# Patient Record
Sex: MALE | Race: WHITE | NOT HISPANIC OR LATINO
[De-identification: names, ages, dates, MRNs, and addresses within clinical notes are randomized per-mention and may not be internally consistent; named-entity substitution may affect disease eponyms.]

---

## 2019-06-26 ENCOUNTER — TRANSCRIPTION ENCOUNTER (OUTPATIENT)
Age: 74
End: 2019-06-26

## 2019-06-28 PROBLEM — Z00.00 ENCOUNTER FOR PREVENTIVE HEALTH EXAMINATION: Status: ACTIVE | Noted: 2019-06-28

## 2019-07-03 ENCOUNTER — RECORD ABSTRACTING (OUTPATIENT)
Age: 74
End: 2019-07-03

## 2019-07-03 DIAGNOSIS — Z80.8 FAMILY HISTORY OF MALIGNANT NEOPLASM OF OTHER ORGANS OR SYSTEMS: ICD-10-CM

## 2019-07-03 DIAGNOSIS — Z80.3 FAMILY HISTORY OF MALIGNANT NEOPLASM OF BREAST: ICD-10-CM

## 2019-07-03 DIAGNOSIS — Z87.891 PERSONAL HISTORY OF NICOTINE DEPENDENCE: ICD-10-CM

## 2019-07-03 DIAGNOSIS — Z78.9 OTHER SPECIFIED HEALTH STATUS: ICD-10-CM

## 2019-07-03 DIAGNOSIS — Z86.010 PERSONAL HISTORY OF COLONIC POLYPS: ICD-10-CM

## 2019-07-03 DIAGNOSIS — Z80.51 FAMILY HISTORY OF MALIGNANT NEOPLASM OF KIDNEY: ICD-10-CM

## 2019-07-03 RX ORDER — METOPROLOL SUCCINATE 25 MG/1
25 TABLET, EXTENDED RELEASE ORAL DAILY
Refills: 0 | Status: ACTIVE | COMMUNITY

## 2019-07-03 RX ORDER — ASPIRIN ENTERIC COATED TABLETS 81 MG 81 MG/1
81 TABLET, DELAYED RELEASE ORAL DAILY
Refills: 0 | Status: ACTIVE | COMMUNITY

## 2019-07-06 ENCOUNTER — RX CHANGE (OUTPATIENT)
Age: 74
End: 2019-07-06

## 2019-07-12 ENCOUNTER — APPOINTMENT (OUTPATIENT)
Dept: GASTROENTEROLOGY | Facility: CLINIC | Age: 74
End: 2019-07-12

## 2019-07-22 ENCOUNTER — APPOINTMENT (OUTPATIENT)
Dept: GASTROENTEROLOGY | Facility: CLINIC | Age: 74
End: 2019-07-22
Payer: COMMERCIAL

## 2019-07-22 VITALS
HEART RATE: 66 BPM | SYSTOLIC BLOOD PRESSURE: 136 MMHG | DIASTOLIC BLOOD PRESSURE: 84 MMHG | WEIGHT: 200 LBS | BODY MASS INDEX: 28 KG/M2 | HEIGHT: 71 IN

## 2019-07-22 PROCEDURE — 99214 OFFICE O/P EST MOD 30 MIN: CPT

## 2019-07-22 RX ORDER — MESALAMINE 1000 MG/1
1000 SUPPOSITORY RECTAL DAILY
Qty: 30 | Refills: 5 | Status: DISCONTINUED | COMMUNITY
Start: 2019-07-06 | End: 2019-07-22

## 2019-07-22 RX ORDER — ROSUVASTATIN CALCIUM 5 MG/1
5 TABLET, FILM COATED ORAL DAILY
Refills: 0 | Status: DISCONTINUED | COMMUNITY
End: 2019-07-22

## 2019-07-22 NOTE — HISTORY OF PRESENT ILLNESS
[FreeTextEntry1] : patient has had long hx of occasional rectal bleeding, which was occurring on a virtually daily basis, which is more than usual.  actually, it was occurring with each bowel movement, but he tends to go less than daily\par \par began, approx two months ago;  which is when he started bleeding more frequently after going two months with virtually no bleeding..\par \par in the bowel, fills the bowel or at least changes the color of the water\par does not tend to drip out\par occas feels hemorrhoid...\par \par

## 2019-07-22 NOTE — ASSESSMENT
[FreeTextEntry1] : 1.  patient is having a level of rectal bleeding that he has not previously witnessed\par 2.  this is over the last two mos\par 3.  note; the patient has been on xarelta 20 mg once per day for the last year, because he experienced a pulmonary embolus, and the etiology was not immediately apparent\par 4.  he has coron dx., had a six vessel bypass some three years ago, and he has no angina or any symptoms of coron dx currently\par 5.  he is seeing dr Valdes next Monday, and I will ask Dr Valdes to just send a note regarding whether Jaspreet has any cardiac contra indication to the colonoscopy \par \par AS WE OBTAIN INFORMED CONSENT FOR COLONOSCOPY, UPPER ENDOSCOPY [EGD], OR BOTH PROCEDURES TOGETHER;\par \par As with all procedures, there are risks of which the patient should be aware\par \par 1.  Anesthesia; deep sedation with Propofol;  there is a small risk of aspiration and pulmonary infection.  The Anesthesiologist meets with the patient the morning of the procedure to discuss in more detail\par \par 2.  risk of bleeding; from removal of a polyp, or rarely, from biopsies, 1 % or less\par \par 3.  risk of injury or perforation of the colon or upper GI tract; one in a thousand or less,  from removing a polyp or from advancing the instrument\par \par \par DR FIELD RECOMMENDATIONS FOR OPTIMAL BOWEL PREP\par \par 1. split the Miralax drink: half the night before, and half the morning of\par 2.  dulcolax 10 mg [2 5 mg tablets]  before each half of the drink\par 3.  diet for breakfast and lunch the day before prep; light solid food, easily chewable, easily digested\par 4.  begin the prep the night before sometime after five pm, one hour after dulcolax is taken\par 5.  the night before; goal is to have loose bowel movements; take one to three extra doses of Miralax, either a 17 gram packet, or a scoopful of the powder, if needed to achieve loose stools\par 6.  the goal the morning of is to have clear liquid stools; otherwise, after the second half of the prep, you should take one to three extra doses of Miralax [see above]\par 7.  remember; the extra doses are only if you are not achieving good results; and the well prepped colon allows a more complete exam\par \par stop xarelta the evening before so basically you are off xarelta for somewhat more than 24 hours.

## 2019-07-22 NOTE — PHYSICAL EXAM
[No Rectal Mass] : no rectal mass [Normal Sphincter Tone] : normal sphincter tone [Internal Hemorrhoid] : internal hemorrhoids [FreeTextEntry1] : no stool in rectal ampulla [External Hemorrhoid] : external hemorrhoids

## 2019-08-18 ENCOUNTER — TRANSCRIPTION ENCOUNTER (OUTPATIENT)
Age: 74
End: 2019-08-18

## 2020-12-01 ENCOUNTER — APPOINTMENT (OUTPATIENT)
Dept: GASTROENTEROLOGY | Facility: CLINIC | Age: 75
End: 2020-12-01
Payer: COMMERCIAL

## 2020-12-01 VITALS
HEART RATE: 60 BPM | DIASTOLIC BLOOD PRESSURE: 70 MMHG | TEMPERATURE: 97.1 F | WEIGHT: 170 LBS | BODY MASS INDEX: 23.8 KG/M2 | SYSTOLIC BLOOD PRESSURE: 128 MMHG | HEIGHT: 71 IN

## 2020-12-01 DIAGNOSIS — Z95.1 PRESENCE OF AORTOCORONARY BYPASS GRAFT: ICD-10-CM

## 2020-12-01 DIAGNOSIS — I48.20 CHRONIC ATRIAL FIBRILLATION, UNSP: ICD-10-CM

## 2020-12-01 PROCEDURE — 99072 ADDL SUPL MATRL&STAF TM PHE: CPT

## 2020-12-01 PROCEDURE — 99214 OFFICE O/P EST MOD 30 MIN: CPT

## 2020-12-01 RX ORDER — RIVAROXABAN 20 MG/1
20 TABLET, FILM COATED ORAL
Refills: 0 | Status: ACTIVE | COMMUNITY

## 2020-12-01 RX ORDER — ROSUVASTATIN CALCIUM 10 MG/1
10 TABLET, FILM COATED ORAL
Refills: 0 | Status: ACTIVE | COMMUNITY

## 2020-12-01 NOTE — HISTORY OF PRESENT ILLNESS
[FreeTextEntry1] : patient with previous hemorrhoidal bleeding one y ago, then did well when he was able to regulate his bowel function, but recently has noted increased irregularity, and then started passing blood approx several weeks ago\par several days ago, a bm,  and then dripping from the rectum, finally it stopped\par but it dripped for approx thirty minutes..\par and this occurred several times.

## 2020-12-01 NOTE — ASSESSMENT
[FreeTextEntry1] : 1.  recurrent rectal bleeding, which by the pattern, of large hemorrhoids, recurrent fresh blood, and sometimes on wiping only, with three occurrences of blood dripping out\par is highly suggestive of hemorrhoidal bleeding\par \par 2.  patient has hx of colon polyp, multiple, and had last colon three years ago...it would be a good idea to do colonoscopy now\par \par 3.  patient also should be referred for evaluation by a colo rectal surgeon, and i advise dr Ruddy Francis to evaluate the patient\par \par 4..patient on combin of asa baby plus xarelto 20 mg per day, which almost certainly exacerbates any blleeding that occurs\par \par 5.. re timing..colonoscopy can be done this month, and get the appt with dr Felipe when possible\par \par would have to stop xarelto two days before the colonoscopy, and this has already been approved by Dr Paulo Valdes, who saw the patient just yesterday and made this suggestion\par \par metoprolol would not be interrupted at all, even take it the morning of the exam\par \par AS WE OBTAIN INFORMED CONSENT FOR COLONOSCOPY, UPPER ENDOSCOPY [EGD], OR BOTH PROCEDURES TOGETHER;\par \par As with all procedures, there are risks of which the patient should be aware\par \par 1.  Anesthesia; deep sedation with Propofol;  there is a small risk of aspiration and pulmonary infection.  The Anesthesiologist meets with the patient the morning of the procedure to discuss in more detail\par \par 2.  risk of bleeding; from removal of a polyp, or rarely, from biopsies, 1 % or less\par \par 3.  risk of injury or perforation of the colon or upper GI tract; one in a thousand or less,  from removing a polyp or from advancing the instrument\par \par \par More than 50% of the face to face time was devoted to counseling and /or coordination of care.  THis coordination of care may have included reviewing other medical notes and reports, and communicating with other health professionals\par

## 2020-12-14 ENCOUNTER — RESULT REVIEW (OUTPATIENT)
Age: 75
End: 2020-12-14

## 2020-12-17 ENCOUNTER — APPOINTMENT (OUTPATIENT)
Dept: GASTROENTEROLOGY | Facility: HOSPITAL | Age: 75
End: 2020-12-17

## 2020-12-21 ENCOUNTER — APPOINTMENT (OUTPATIENT)
Dept: SURGERY | Facility: CLINIC | Age: 75
End: 2020-12-21
Payer: COMMERCIAL

## 2020-12-21 VITALS
BODY MASS INDEX: 24.08 KG/M2 | OXYGEN SATURATION: 99 % | WEIGHT: 172 LBS | HEIGHT: 71 IN | SYSTOLIC BLOOD PRESSURE: 146 MMHG | DIASTOLIC BLOOD PRESSURE: 76 MMHG | HEART RATE: 63 BPM

## 2020-12-21 DIAGNOSIS — K62.5 HEMORRHAGE OF ANUS AND RECTUM: ICD-10-CM

## 2020-12-21 PROCEDURE — 99203 OFFICE O/P NEW LOW 30 MIN: CPT | Mod: 25

## 2020-12-21 PROCEDURE — 99072 ADDL SUPL MATRL&STAF TM PHE: CPT

## 2020-12-21 PROCEDURE — 46600 DIAGNOSTIC ANOSCOPY SPX: CPT

## 2020-12-21 NOTE — HISTORY OF PRESENT ILLNESS
[FreeTextEntry1] : 75 M with history of rectal bleeding. Underwent colonosocpy with Dr Patton, found to have enlarged internal hemorrhoids, referred here for possible treatment. \par \par Patient is on aspirin and xarelto for distant history of a-fib and PE. Had two episodes of significant bleeding approximately a month ago. Since then has had no bleeding episodes. Had a similar event more than a year ago. \par \par Moves his bowels daily. uses miralax mixed with tea to have regular bowel movements. eats healthy, lost weight intentionally the past year.

## 2020-12-21 NOTE — CONSULT LETTER
[Dear  ___] : Dear  [unfilled], [Consult Letter:] : I had the pleasure of evaluating your patient, [unfilled]. [Please see my note below.] : Please see my note below. [Consult Closing:] : Thank you very much for allowing me to participate in the care of this patient.  If you have any questions, please do not hesitate to contact me. [Sincerely,] : Sincerely, [FreeTextEntry3] : Ruddy narvaez MD

## 2020-12-21 NOTE — PHYSICAL EXAM
[Abdomen Masses] : No abdominal masses [Abdomen Tenderness] : ~T No ~M abdominal tenderness [Excoriation] : no perianal excoriation [Fistula] : no fistulas [Reduce Spontaneously] : a spontaneously reducible (grade II) [Normal] : was normal [None] : there was no rectal abscess [JVD] : no jugular venous distention  [Respiratory Effort] : normal respiratory effort [Normal Rate and Rhythm] : normal rate and rhythm [No Rash or Lesion] : No rash or lesion [Alert] : alert [Calm] : calm [de-identified] : External skin tags.  [de-identified] : No acute distress

## 2020-12-21 NOTE — ASSESSMENT
[FreeTextEntry1] : 75 M seen for bleeding per rectum and internal hemorrhoids. \par \par Discussed management of hemorrhoids including medical managemetn, office based procedures and surgery. Patient has both internal hemorrhoids and external skin tags, both of which are currently asymptomatic. \par \par Patient has had bleeding episodes in the past and is currently on xarelto, with undefined endpoint, unclear if it is for his PEs or a-fib. \par \par Discussed with patient that he can start with medical management including adding fiber, 6-8 glasses uncaffeinated beverage and good bowel habits with minimal time on the toilet when moving bowels. \par \par If continues to bleed and is unable to stop xarelto would consider phenol injection to sclerose  hemorrhoids given risk of bleeding with rubber band ligation. \par \par Patient will follow up as needed if bleeding symptoms recur.

## 2020-12-21 NOTE — PROCEDURE
[FreeTextEntry1] : Anoscopy: Moderately enlarged internal hemorrhoids, no active bleeding. No masses.

## 2021-01-28 ENCOUNTER — APPOINTMENT (OUTPATIENT)
Dept: GASTROENTEROLOGY | Facility: CLINIC | Age: 76
End: 2021-01-28
Payer: COMMERCIAL

## 2021-01-28 DIAGNOSIS — D36.9 BENIGN NEOPLASM, UNSPECIFIED SITE: ICD-10-CM

## 2021-01-28 DIAGNOSIS — K64.8 OTHER HEMORRHOIDS: ICD-10-CM

## 2021-01-28 PROCEDURE — 99442: CPT

## 2021-01-28 NOTE — ASSESSMENT
Continue to MONITOR CLOSELY to determine the need for TREATMENT and INCREASE/DECREASE in length of time till next follow up visit. [FreeTextEntry1] : 1  hemorrhoidal bleeding\par \par doing very well\par no further episodes of rectal bleeding\par \par staying on a high fiber diet and there is no problem..\par his bms are pretty regular for him\par \par follow up colonoscopy in three years\par \par and he will call prn\par \par More than 50% of the face to face time was devoted to counseling and /or coordination of care.  THis coordination of care may have included reviewing other medical notes and reports, and communicating with other health professionals\par

## 2021-01-28 NOTE — HISTORY OF PRESENT ILLNESS
[FreeTextEntry1] : telephonic visit; consent on file\par \par patient is doing well\par saw dr Francis who suggested a medical regimen rather than surgery, after he examined Jaspreet\par \par since that time, no further episodes of rectal bleeding, and Jaspreet is doing very well

## 2021-04-02 ENCOUNTER — TRANSCRIPTION ENCOUNTER (OUTPATIENT)
Age: 76
End: 2021-04-02

## 2021-08-04 ENCOUNTER — TRANSCRIPTION ENCOUNTER (OUTPATIENT)
Age: 76
End: 2021-08-04

## 2021-09-14 ENCOUNTER — TRANSCRIPTION ENCOUNTER (OUTPATIENT)
Age: 76
End: 2021-09-14

## 2021-10-15 ENCOUNTER — TRANSCRIPTION ENCOUNTER (OUTPATIENT)
Age: 76
End: 2021-10-15

## 2022-05-16 ENCOUNTER — TRANSCRIPTION ENCOUNTER (OUTPATIENT)
Age: 77
End: 2022-05-16

## 2022-05-19 ENCOUNTER — APPOINTMENT (OUTPATIENT)
Age: 77
End: 2022-05-19

## 2024-12-23 NOTE — HISTORY OF PRESENT ILLNESS
[FreeTextEntry1] : Romulo is a 79-year-old right-handed gentleman with a history of PE, CAD s/p surgery, paroxysmal atrial fibrillation (no longer on xarelto), hypertension and hyperlipidemia presenting to clinic for subjective cognitive complaints. He reports that he is forgetting words. For example, he could not remember the word for amplifier or "stoic". It is happening more often than it used to. His wife asks him if he remembers a restaurant they went to months ago and he will have no memory. Retired this year. Was a . No known history of stroke. Maternal aunts all with Alzheimer's.   Allergies  non-allergic rhinitis  Medications aspirin 81 metoprolol succinate ER 25 mg daily  rosuvastatin 10 mg daily  famotidine  lisinopril  protonix  viagra   Surgery CABG bypass surgery    Social History Work as a  Retired in 2024 No cigarettes, stopped in mid nineties Drink - one glass every night - drink bourbon every night  no drugs   Surgeries  CABG  Right hip replacement   Family History Mother - 72 -  breast cancer Father -  63  stroke 1 brother -  72 glioblastoma

## 2024-12-23 NOTE — DATA REVIEWED
[de-identified] : 11/2024 Lyme negative, Folate 7.7 WNL, vitamin b12 336 (borderline low),TSH WNL   10/2024 creatinine 1.26

## 2024-12-23 NOTE — DISCUSSION/SUMMARY
[FreeTextEntry1] : Romulo Kraus is a pleasant 79-year-old RH gentleman with history of paroxysmal atrial fibrillation (alejandro-operative only), no longer on xarelto, CAD, hld and hypertension presenting with subjective difficulties with word finding. Scored 30/03 on MOCA. Provided reassurance. Can get MRI given vascular risk factors and will do formal neurocognitive testing. Vitamin b12 is borderline low, goal > 400, will supplement. Also with some numbness in feet by report and on exam of unclear etiology. Will do serologic work up.

## 2024-12-23 NOTE — PHYSICAL EXAM
[FreeTextEntry1] : Mental Status: MOCA 30/3  Language/Speech : able to name wrist, elbow, cord  Cranial Nerves  II: Pupils equal and reactive,  VFF full III, IV, VI: EOMI V : normal sensation in V1-V3 b/l VII : no asymmetry, no nasolabial fold flattening VIII: normal hearing to voice  IX, X: normal palatal elevation, uvula midline XI: 5/5 head turn and 5/5 shoulder shrug bilaterally XII : midline tongue protrusion Motor: no drift in limbs, normal bulk and tone, 5/5 in all extremities Sensory: normal to light touch, diminished vibratory sensation Reflexes: brachioradialis, biceps 1+ b.l  triceps 2+ b/l , patella  2+ b./l and ankles 1+ bilaterally Toes are down-going  Coordination: Normal finger to nose Gait: normal balance and gait, able to toe/heel/tandem

## 2024-12-23 NOTE — ASSESSMENT
[FreeTextEntry1] : Your vitamin b12 level was low-normal Please take vitamin b12 1000 mcg daily - can get this over the counter at any pharmacy Please get labwork Neuropsych testing MRI brain Return to clinic in three months to see Dr. Horton  For brain health  - healthy eating/diet for memory cognition with diet rich in fiber, fruits and vegetables and low in saturated fats, processed foods. - good sleep, 7-8 hours a night. No use of phone or watching television before bed. - aerobic exercise, at least 30 minutes, such as a brisk walk 3-4 times a week. - keep mind active with puzzles, reading , socializing with others. - abstaining from excess alcohol, drug use. - blood pressure and cholesterol monitoring , blood glucose monitoring to prevent microvascular disease which can lead to cognitive impairment.

## 2024-12-23 NOTE — DATA REVIEWED
[de-identified] : 11/2024 Lyme negative, Folate 7.7 WNL, vitamin b12 336 (borderline low),TSH WNL   10/2024 creatinine 1.26

## 2025-05-01 NOTE — DATA REVIEWED
[de-identified] : 11/2024 Lyme negative, Folate 7.7 WNL, vitamin b12 336 (borderline low),TSH WNL   10/2024 creatinine 1.26

## 2025-05-01 NOTE — HISTORY OF PRESENT ILLNESS
[FreeTextEntry1] : 27 Romulo is here for f/u appointment. He denies any changes in his mental status.  He had neuropsych testing done in 2025 that was normal. He had MRI brain done in 2025 that showed no acute intracranial pathology or atrophy out of proportion to his age. He denies any changes in speech or language, memory.  Last B12 taken on 25 was 836. Patient endorses improvement in numbness in his feet. denies any gait instability. His A1c in dec was 5.8. _______________________  HPI: Romulo is a 79-year-old right-handed gentleman with a history of PE, CAD s/p surgery, paroxysmal atrial fibrillation (no longer on xarelto), hypertension and hyperlipidemia presenting to clinic for subjective cognitive complaints. He reports that he is forgetting words. For example, he could not remember the word for amplifier or "stoic". It is happening more often than it used to. His wife asks him if he remembers a restaurant they went to months ago and he will have no memory. Retired this year. Was a . No known history of stroke. Maternal aunts all with Alzheimer's.   Allergies  non-allergic rhinitis  Medications aspirin 81 metoprolol succinate ER 25 mg daily  rosuvastatin 10 mg daily  famotidine  lisinopril  protonix  viagra   Surgery CABG bypass surgery    Social History Work as a  Retired in 2024 No cigarettes, stopped in mid nineties Drink - one glass every night - drink bourbon every night  no drugs   Surgeries  CABG  Right hip replacement   Family History Mother - 72 -  breast cancer Father -  63  stroke 1 brother -  72 glioblastoma

## 2025-05-01 NOTE — DISCUSSION/SUMMARY
[FreeTextEntry1] : Romulo Kraus is a pleasant 80-year-old RH gentleman with history of paroxysmal atrial fibrillation (alejandro-operative only), no longer on xarelto, CAD, hld and hypertension presenting with subjective difficulties with word finding. Scored 30/30 on MOCA in Dec 2024. MRI in Jan 2025 showed Ischemic white matter disease lower range typical for age and Diffuse brain volume loss typical for age.  No differential cerebral hemispheric lobar atrophy. No differential hippocampal formation atrophy. Neuropsych eval in Feb 2025 is normal. He presented with numbness at previous visit that has improved with PO vitamin B12 supplements. Last b12 836.  He can follow up as needed.

## 2025-05-01 NOTE — ASSESSMENT
[FreeTextEntry1] : Please continue vitamin b12 1000 mcg daily - can get this over the counter at any pharmacy Work on blood sugar, limit the amount of sugar, carbs, processed foods you eat. Please follow up PCP for further monitoring of elevated blood sugar  Follow up as needed   For brain health  - healthy eating/diet for memory cognition with diet rich in fiber, fruits and vegetables and low in saturated fats, processed foods. - good sleep, 7-8 hours a night. No use of phone or watching television before bed. - aerobic exercise, at least 30 minutes, such as a brisk walk 3-4 times a week. - keep mind active with puzzles, reading , socializing with others. - abstaining from excess alcohol, drug use. - blood pressure and cholesterol monitoring , blood glucose monitoring to prevent microvascular disease which can lead to cognitive impairment.

## 2025-05-01 NOTE — PHYSICAL EXAM
[FreeTextEntry1] : Physical examination   General: No acute distress, Awake, Alert Neck: supple   Mental status Awake, alert, and oriented to person, time and place, Normal attention span and concentration, Recent and remote memory intact, Language intact, Fund of knowledge intact. Named 3/3 objects. Knows 4 quarters in $1.00, knows 7 quarters in $1.75. Spelled WORLD backwards    Cranial Nerves II: VFF III, IV, VI: PERRL, EOMI. V: Facial sensation is normal B/L. VII: Facial strength is normal B/L. VIII: Gross hearing is intact. IX, X: Palate is midline and elevates symmetrically. XI: Trapezius normal strength. XII: Tongue midline without atrophy or fasciculations.   Motor exam Muscle tone - no evidence of rigidity or resistance in all 4 extremities. No atrophy or fasciculations Muscle Strength: arms and legs, proximal and distal flexors and extensors are normal No UE drift.  Reflexes All present, normal, and symmetrical.    Coordination Finger to nose: Normal. Romberg negative    Sensory Intact sensation to light touch in all extremities   Gait Normal